# Patient Record
Sex: FEMALE | Race: WHITE | NOT HISPANIC OR LATINO | Employment: FULL TIME | URBAN - METROPOLITAN AREA
[De-identification: names, ages, dates, MRNs, and addresses within clinical notes are randomized per-mention and may not be internally consistent; named-entity substitution may affect disease eponyms.]

---

## 2017-01-04 ENCOUNTER — APPOINTMENT (OUTPATIENT)
Dept: PHYSICAL THERAPY | Facility: CLINIC | Age: 62
End: 2017-01-04
Payer: COMMERCIAL

## 2017-01-04 PROCEDURE — 97140 MANUAL THERAPY 1/> REGIONS: CPT

## 2017-01-04 PROCEDURE — 97110 THERAPEUTIC EXERCISES: CPT

## 2017-01-11 ENCOUNTER — APPOINTMENT (OUTPATIENT)
Dept: PHYSICAL THERAPY | Facility: CLINIC | Age: 62
End: 2017-01-11
Payer: COMMERCIAL

## 2017-01-13 ENCOUNTER — APPOINTMENT (OUTPATIENT)
Dept: PHYSICAL THERAPY | Facility: CLINIC | Age: 62
End: 2017-01-13
Payer: COMMERCIAL

## 2020-06-23 ENCOUNTER — EVALUATION (OUTPATIENT)
Dept: PHYSICAL THERAPY | Facility: CLINIC | Age: 65
End: 2020-06-23
Payer: COMMERCIAL

## 2020-06-23 DIAGNOSIS — M25.512 ACUTE PAIN OF LEFT SHOULDER: Primary | ICD-10-CM

## 2020-06-23 PROCEDURE — 97161 PT EVAL LOW COMPLEX 20 MIN: CPT | Performed by: PHYSICAL THERAPIST

## 2020-06-24 ENCOUNTER — TRANSCRIBE ORDERS (OUTPATIENT)
Dept: PHYSICAL THERAPY | Facility: CLINIC | Age: 65
End: 2020-06-24

## 2020-06-24 DIAGNOSIS — M25.512 ACUTE PAIN OF LEFT SHOULDER: Primary | ICD-10-CM

## 2020-06-29 ENCOUNTER — OFFICE VISIT (OUTPATIENT)
Dept: PHYSICAL THERAPY | Facility: CLINIC | Age: 65
End: 2020-06-29
Payer: COMMERCIAL

## 2020-06-29 DIAGNOSIS — M25.512 ACUTE PAIN OF LEFT SHOULDER: Primary | ICD-10-CM

## 2020-06-29 PROCEDURE — 97110 THERAPEUTIC EXERCISES: CPT

## 2020-06-29 PROCEDURE — 97112 NEUROMUSCULAR REEDUCATION: CPT

## 2020-07-06 ENCOUNTER — OFFICE VISIT (OUTPATIENT)
Dept: PHYSICAL THERAPY | Facility: CLINIC | Age: 65
End: 2020-07-06
Payer: COMMERCIAL

## 2020-07-06 DIAGNOSIS — M25.512 ACUTE PAIN OF LEFT SHOULDER: Primary | ICD-10-CM

## 2020-07-06 PROCEDURE — 97112 NEUROMUSCULAR REEDUCATION: CPT | Performed by: PHYSICAL THERAPIST

## 2020-07-06 PROCEDURE — 97140 MANUAL THERAPY 1/> REGIONS: CPT | Performed by: PHYSICAL THERAPIST

## 2020-07-06 PROCEDURE — 97110 THERAPEUTIC EXERCISES: CPT | Performed by: PHYSICAL THERAPIST

## 2020-07-06 NOTE — PROGRESS NOTES
Daily Note     Today's date: 2020  Patient name: Cary Rojo  : 1955  MRN: [id number]  Referring provider: John Carlos MD  Dx:   Encounter Diagnosis     ICD-10-CM    1  Acute pain of left shoulder M25 512                   Subjective: Pt reports feeling some tightness tonight      Objective: See treatment flow sheet  Assessment: Tolerated treatment well  She has restricted ST mobs left shoulder in all directions especially upward rotation  Plan: Continue per plan of care        Precautions:  None      Manuals 20     Visit # 1 2 3     PROM   5 min Left shld       MWM for scaption x 10 total      STM   5 min  UT, levator     Warm-up        NuStep                Neuro Re-Ed        Scap squeeze  GTB horizontal abd w/ scap sq x20 20       Wall posture assessment x 4-5 mins  4 min                     Ther Ex        Pulleys  x4 min scap and flex w/u 4 min     TB row  GTB 3x10 30   GTB     TB ext  GTB 3x10 30   GTB     TB IR/ER  Manual resistance x 20 each 20     IR stretch w/ strap  Attempted x 2-3 mins (pain) 3 min     Wand flex 10x Review  10     Wand ER   10       Wall slides x20 total 20     Extension stretch   10x             Ther Activity                                Modalities        CP/MH - post  home 10 min  MH

## 2020-07-09 ENCOUNTER — OFFICE VISIT (OUTPATIENT)
Dept: PHYSICAL THERAPY | Facility: CLINIC | Age: 65
End: 2020-07-09
Payer: COMMERCIAL

## 2020-07-09 DIAGNOSIS — M25.512 ACUTE PAIN OF LEFT SHOULDER: Primary | ICD-10-CM

## 2020-07-09 PROCEDURE — 97110 THERAPEUTIC EXERCISES: CPT | Performed by: PHYSICAL THERAPIST

## 2020-07-09 PROCEDURE — 97140 MANUAL THERAPY 1/> REGIONS: CPT | Performed by: PHYSICAL THERAPIST

## 2020-07-09 PROCEDURE — 97112 NEUROMUSCULAR REEDUCATION: CPT | Performed by: PHYSICAL THERAPIST

## 2020-07-09 NOTE — PROGRESS NOTES
Daily Note     Today's date: 2020  Patient name: Silvia Samaniego  : 1955  MRN: [id number]  Referring provider: Gilmer Mckeon MD  Dx:   Encounter Diagnosis     ICD-10-CM    1  Acute pain of left shoulder M25 512                   Subjective: Pt reports feeling soreness 2/10 left shoulder      Objective: See treatment flow sheet  Assessment: Tolerated treatment well  She has restricted motion and soreness with IR stretch using the strap  She has agreed to add this to HEP  Plan: Continue per plan of care        Precautions:  None      Manuals 20    Visit # 1 2 3 4    PROM   5 min Left shld 5'      MWM for scaption x 10 total      STM   5 min  UT, levator 5'    Warm-up        NuStep    10 min            Neuro Re-Ed        Scap squeeze  GTB horizontal abd w/ scap sq x20 20 20      Wall posture assessment x 4-5 mins  4 min 4 '                    Ther Ex        Pulleys  x4 min scap and flex w/u 4 min 4'    TB row  GTB 3x10 30   GTB 30   GTB    TB ext  GTB 3x10 30   GTB 30   GTB    TB IR/ER  Manual resistance x 20 each 20 20   YTB    IR stretch w/ strap  Attempted x 2-3 mins (pain) 3 min 4 '    Wand flex 10x Review  10 10    Wand ER   10 10      Wall slides x20 total 20 20    Extension stretch   10x 10            Ther Activity                                Modalities        CP/MH - post  home 10 min  MH 5 min  MH

## 2020-07-13 ENCOUNTER — OFFICE VISIT (OUTPATIENT)
Dept: PHYSICAL THERAPY | Facility: CLINIC | Age: 65
End: 2020-07-13
Payer: COMMERCIAL

## 2020-07-13 DIAGNOSIS — M25.512 ACUTE PAIN OF LEFT SHOULDER: Primary | ICD-10-CM

## 2020-07-13 PROCEDURE — 97110 THERAPEUTIC EXERCISES: CPT | Performed by: PHYSICAL THERAPIST

## 2020-07-13 PROCEDURE — 97140 MANUAL THERAPY 1/> REGIONS: CPT | Performed by: PHYSICAL THERAPIST

## 2020-07-13 PROCEDURE — 97112 NEUROMUSCULAR REEDUCATION: CPT | Performed by: PHYSICAL THERAPIST

## 2020-07-13 NOTE — PROGRESS NOTES
Daily Note     Today's date: 2020  Patient name: Isabel Gonzalez  : 1955  MRN: [id number]  Referring provider: Prince Avila MD  Dx:   Encounter Diagnosis     ICD-10-CM    1  Acute pain of left shoulder M25 512                   Subjective: Pt reports 3/10 pain left shoulder today  Objective: See treatment flow sheet  Assessment: Tolerated treatment well  She has end range restrictions to left shoulder ER  Plan: Continue per plan of care        Precautions:  None      Manuals 20   Visit # 1 2 3 4 5   PROM   5 min Left shld 5' 5'     MWM for scaption x 10 total      STM   5 min  UT, levator 5' 5'   Warm-up        NuStep    10 min 10 min           Neuro Re-Ed        Scap squeeze  GTB horizontal abd w/ scap sq x20 20 20 20     Wall posture assessment x 4-5 mins  4 min 4 ' 4'                   Ther Ex        Pulleys  x4 min scap and flex w/u 4 min 4' 5'   TB row  GTB 3x10 30   GTB 30   GTB 30    GTB   TB ext  GTB 3x10 30   GTB 30   GTB 30    GTB   TB IR/ER  Manual resistance x 20 each 20 20   YTB 20    YTB   IR stretch w/ strap  Attempted x 2-3 mins (pain) 3 min 4 ' 4'   Wand flex 10x Review  10 10 10   Wand ER   10 10 10     Wall slides x20 total 20 20 20   Extension stretch   10x 10 10           Ther Activity                                Modalities        CP/MH - post  home 10 min  MH 5 min  MH deferred

## 2020-07-16 ENCOUNTER — OFFICE VISIT (OUTPATIENT)
Dept: PHYSICAL THERAPY | Facility: CLINIC | Age: 65
End: 2020-07-16
Payer: COMMERCIAL

## 2020-07-16 DIAGNOSIS — M25.512 ACUTE PAIN OF LEFT SHOULDER: Primary | ICD-10-CM

## 2020-07-16 PROCEDURE — 97140 MANUAL THERAPY 1/> REGIONS: CPT | Performed by: PHYSICAL THERAPIST

## 2020-07-16 PROCEDURE — 97110 THERAPEUTIC EXERCISES: CPT | Performed by: PHYSICAL THERAPIST

## 2020-07-16 PROCEDURE — 97112 NEUROMUSCULAR REEDUCATION: CPT | Performed by: PHYSICAL THERAPIST

## 2020-07-16 NOTE — PROGRESS NOTES
Daily Note     Today's date: 2020  Patient name: Moris Nina  : 1955  MRN: [id number]  Referring provider: Chandrakant Pacheco MD  Dx:   Encounter Diagnosis     ICD-10-CM    1  Acute pain of left shoulder M25 512                   Subjective: Pt reports some improvement in her ability to reach her back  Objective: See treatment flow sheet  Assessment: Tolerated treatment well  ER and IR remain limited at end range  Plan: Continue per plan of care        Precautions:  None      Manuals 20       Visit # 6       PROM 5'               STM 5'       Warm-up        NuStep 10'               Neuro Re-Ed        Scap squeeze 20                               Ther Ex        Pulleys 5'       TB row 30    GTB       TB ext 30    GTB       TB IR/ER 20   RTB       IR stretch w/ strap 4'       Wand flex 20x       Wand ER 20x       Wall slide w/ liftoff 20       Extension stretch 10               Ther Activity                                Modalities        CP/MH - post ---

## 2020-07-20 ENCOUNTER — OFFICE VISIT (OUTPATIENT)
Dept: PHYSICAL THERAPY | Facility: CLINIC | Age: 65
End: 2020-07-20
Payer: COMMERCIAL

## 2020-07-20 DIAGNOSIS — M25.512 ACUTE PAIN OF LEFT SHOULDER: Primary | ICD-10-CM

## 2020-07-20 PROCEDURE — 97110 THERAPEUTIC EXERCISES: CPT

## 2020-07-20 NOTE — PROGRESS NOTES
Daily Note     Today's date: 2020  Patient name: Deshaun Norman  : 1955  MRN: [id number]  Referring provider: Governor Mj MD  Dx:   Encounter Diagnosis     ICD-10-CM    1  Acute pain of left shoulder M25 512                   Subjective: Patient states she is having difficulties putting on her bra, so she has to wear a forward clipped bra at this time  Reaching behind her back is difficult for her  Objective: See treatment diary below      Assessment: Tolerated treatment well  Patient would benefit from continued PT to improve L shoulder ROM and strength, and overall functional mobility  Patient tolerated progressions well today  Patient demonstrates forward trunk lean with internal rotation strap stretch  Verbal cueing required to prevent lean  Continue to progress as able  Plan: Continue per plan of care        Precautions:  None      Manuals 20      Visit # 6 7      PROM 5' 5'              STM 5' 5'      Warm-up        NuStep 10' 10'              Neuro Re-Ed        Scap squeeze 20 20                              Ther Ex        Pulleys 5' 5'      TB row 27    GTB 30    GTB      TB ext 30    GTB 30 GTB      TB IR/ER 20   RTB 20 GTB      IR stretch w/ strap 4' 4'      Wand flex 20x 20      Wand ER 20x 20      Wall slide w/ liftoff 20 20      Extension stretch 10 10              Ther Activity                                Modalities        CP/MH - post ---

## 2020-07-22 ENCOUNTER — APPOINTMENT (OUTPATIENT)
Dept: PHYSICAL THERAPY | Facility: CLINIC | Age: 65
End: 2020-07-22
Payer: COMMERCIAL

## 2020-07-27 ENCOUNTER — OFFICE VISIT (OUTPATIENT)
Dept: PHYSICAL THERAPY | Facility: CLINIC | Age: 65
End: 2020-07-27
Payer: COMMERCIAL

## 2020-07-27 DIAGNOSIS — M25.512 ACUTE PAIN OF LEFT SHOULDER: Primary | ICD-10-CM

## 2020-07-27 PROCEDURE — 97140 MANUAL THERAPY 1/> REGIONS: CPT | Performed by: PHYSICAL THERAPIST

## 2020-07-27 PROCEDURE — 97110 THERAPEUTIC EXERCISES: CPT | Performed by: PHYSICAL THERAPIST

## 2020-07-27 NOTE — PROGRESS NOTES
Daily Note     Today's date: 2020  Patient name: Maine Fink  : 1955  MRN: [id number]  Referring provider: Janette Arreola MD  Dx:   Encounter Diagnosis     ICD-10-CM    1  Acute pain of left shoulder M25 512                   Subjective:  She is compliant with IR stretching and it feels like it is improving  Objective: See treatment diary below      Assessment: Tolerated treatment well  She completed exercises with only end range discomfort  She would benefit from continued PT to resolve ROM restrictions left shoulder  Plan: Continue per plan of care        Precautions:  None      Manuals 20     Visit # 6 7 8     PROM 5' 5' 5'             STM 5' 5' 5'     Warm-up        NuStep 10' 10' 10'             Neuro Re-Ed        Scap squeeze 20 20 20                             Ther Ex        Pulleys 5' 5' 5'     TB row 30    GTB 30    GTB 30   GTB     TB ext 30    GTB 30 GTB 30   GTB     TB IR/ER 20   RTB 20 GTB 20   GTB     IR stretch w/ strap 4' 4' 4'     Wand flex 20x 20 20     Wand ER 20x 20 20     Wall slide w/ liftoff 20 20 20     Extension stretch 10 10 20             Ther Activity                                Modalities        CP/MH - post ---

## 2020-08-05 ENCOUNTER — OFFICE VISIT (OUTPATIENT)
Dept: PHYSICAL THERAPY | Facility: CLINIC | Age: 65
End: 2020-08-05
Payer: COMMERCIAL

## 2020-08-05 DIAGNOSIS — M25.512 ACUTE PAIN OF LEFT SHOULDER: Primary | ICD-10-CM

## 2020-08-05 PROCEDURE — 97140 MANUAL THERAPY 1/> REGIONS: CPT | Performed by: PHYSICAL THERAPIST

## 2020-08-05 PROCEDURE — 97110 THERAPEUTIC EXERCISES: CPT | Performed by: PHYSICAL THERAPIST

## 2020-08-05 NOTE — PROGRESS NOTES
Daily Note     Today's date: 2020  Patient name: Zhen Ramírez  : 1955  MRN: [id number]  Referring provider: Tika Justice MD  Dx:   Encounter Diagnosis     ICD-10-CM    1  Acute pain of left shoulder  M25 512                   Subjective:  She notes some improvement donning her bra but still has stiffness with initial attempts  Objective: See treatment diary below      Assessment: Tolerated treatment well  She agreed to add extension stretch and pes stretch to HEP daily  Plan: Continue per plan of care        Precautions:  None      Manuals 20    Visit # 6 7 8 9    PROM 5' 5' 5' 5'            STM 5' 5' 5' 5'    Warm-up        NuStep 10' 10' 10' 10'            Neuro Re-Ed        Scap squeeze 20 20 20 20                            Ther Ex        Pulleys 5' 5' 5' 5'    TB row 30    GTB 30    GTB 30   GTB 30   GTB    TB ext 30    GTB 30 GTB 30   GTB 30   GTB    TB IR/ER 20   RTB 20 GTB 20   GTB 30   GTB    IR stretch w/ strap 4' 4' 4' 4'    Wand flex 20x 20 20 20    Wand ER 20x 20 20 20    Wall slide w/ liftoff 20 20 20 20    Extension stretch 10 10 20 20    Pec stretch    10    Ther Activity                                Modalities        CP/MH - post ---   --

## 2020-08-12 ENCOUNTER — APPOINTMENT (OUTPATIENT)
Dept: PHYSICAL THERAPY | Facility: CLINIC | Age: 65
End: 2020-08-12
Payer: COMMERCIAL

## 2020-08-19 ENCOUNTER — OFFICE VISIT (OUTPATIENT)
Dept: PHYSICAL THERAPY | Facility: CLINIC | Age: 65
End: 2020-08-19
Payer: COMMERCIAL

## 2020-08-19 DIAGNOSIS — M25.512 ACUTE PAIN OF LEFT SHOULDER: Primary | ICD-10-CM

## 2020-08-19 PROCEDURE — 97140 MANUAL THERAPY 1/> REGIONS: CPT | Performed by: PHYSICAL THERAPIST

## 2020-08-19 PROCEDURE — 97110 THERAPEUTIC EXERCISES: CPT | Performed by: PHYSICAL THERAPIST

## 2020-08-19 NOTE — PROGRESS NOTES
Daily Note     Today's date: 2020  Patient name: Polo Alvarenga  : 1955  MRN: [id number]  Referring provider: Lucy France MD  Dx:   Encounter Diagnosis     ICD-10-CM    1  Acute pain of left shoulder  M25 512                   Subjective:  No pain tonight just mild stiffness      Objective: See treatment diary below      Assessment: Tolerated treatment well  ER of left shoulder has end range ROM restrictions  Plan: Continue per plan of care        Precautions:  None      Manuals 20   Visit # 6 7 8 9 10   PROM 5' 5' 5' 5' 5'           STM 5' 5' 5' 5' 5'   Warm-up        NuStep 10' 10' 10' 10' 10'           Neuro Re-Ed        Scap squeeze 20 20 20 20 20                           Ther Ex        Pulleys 5' 5' 5' 5' 5'   TB row 30    GTB 30    GTB 30   GTB 30   GTB 30   BTB   TB ext 30    GTB 30 GTB 30   GTB 30   GTB 30   BTB   TB IR/ER 20   RTB 20 GTB 20   GTB 30   GTB 30   GTB   IR stretch w/ strap 4' 4' 4' 4' 4'   Wand flex 20x 20 20 20 20   Wand ER 20x 20 20 20 20   Wall slide w/ liftoff 20 20 20 20 20   Extension stretch 10 10 20 20 20   Pec stretch    10 15   Ther Activity                                Modalities        CP/MH - post ---   -- --

## 2020-08-27 ENCOUNTER — OFFICE VISIT (OUTPATIENT)
Dept: PHYSICAL THERAPY | Facility: CLINIC | Age: 65
End: 2020-08-27
Payer: COMMERCIAL

## 2020-08-27 DIAGNOSIS — M25.512 ACUTE PAIN OF LEFT SHOULDER: Primary | ICD-10-CM

## 2020-08-27 PROCEDURE — 97140 MANUAL THERAPY 1/> REGIONS: CPT | Performed by: PHYSICAL THERAPIST

## 2020-08-27 PROCEDURE — 97110 THERAPEUTIC EXERCISES: CPT | Performed by: PHYSICAL THERAPIST

## 2020-08-27 NOTE — PROGRESS NOTES
Daily Note     Today's date: 2020  Patient name: Rosina Macedo  : 1955  MRN: [id number]  Referring provider: Carlee Loera MD  Dx:   Encounter Diagnosis     ICD-10-CM    1  Acute pain of left shoulder  M25 512                   Subjective:  Brett Elder reports some stiffness left shoulder  Objective: See treatment diary below      Assessment: Tolerated treatment well  Only end range restrictions remain  Patient would benefit from addition f [place] stretch to improve end range moblity      Plan: Continue per plan of care        Precautions:  None      Manuals 20       Visit # 11       PROM 5'               STM 5'       Warm-up        NuStep 10'               Neuro Re-Ed                                Ther Ex        Pulleys 5'       TB row 30    GTB       TB ext 30    GTB       TB IR/ER 20   RTB       IR stretch w/ strap 4'       Wand flex 20x       Wand ER 20x       Wall slide w/ liftoff 20       Extension stretch 10       Pec stretch 15       [place] stretch                        Ther Activity                                Modalities        CP/MH - post ---

## 2020-09-03 ENCOUNTER — OFFICE VISIT (OUTPATIENT)
Dept: PHYSICAL THERAPY | Facility: CLINIC | Age: 65
End: 2020-09-03
Payer: COMMERCIAL

## 2020-09-03 DIAGNOSIS — M25.512 ACUTE PAIN OF LEFT SHOULDER: Primary | ICD-10-CM

## 2020-09-03 PROCEDURE — 97110 THERAPEUTIC EXERCISES: CPT | Performed by: PHYSICAL THERAPIST

## 2020-09-03 PROCEDURE — 97140 MANUAL THERAPY 1/> REGIONS: CPT | Performed by: PHYSICAL THERAPIST

## 2020-09-03 NOTE — PROGRESS NOTES
Daily Note     Today's date: 9/3/2020  Patient name: Zoë Robbins  : 1955  MRN: [id number]  Referring provider: Yolanda Woodward MD  Dx:   Encounter Diagnosis     ICD-10-CM    1  Acute pain of left shoulder  M25 512                   Subjective:  Dk Kim reports 1/10 soreness today just because it's the end of the day      Objective: See treatment diary below      Assessment: Tolerated treatment well  [place] stretch helped improve end range tightness for flexion and abduction  Plan: Continue per plan of care        Precautions:  None      Manuals 8/27/20 9/3/20      Visit # 11 12      PROM 5' 5'              STM 5' 5'      Warm-up        NuStep 10' 10'              Neuro Re-Ed                                Ther Ex        Pulleys 5' 5'      TB row 30    GTB 30   GTB      TB ext 30    GTB 30   GTB      TB IR/ER 20   RTB 20   RTB      IR stretch w/ strap 4' 4'      Wand flex 20x 20      Wand ER 20x 20      Wall slide w/ liftoff 20 20      Extension stretch 10 20      Pec stretch 15 15      [place] stretch  3x ea                      Ther Activity                                Modalities        CP/MH - post --- ---

## 2020-09-10 ENCOUNTER — APPOINTMENT (OUTPATIENT)
Dept: PHYSICAL THERAPY | Facility: CLINIC | Age: 65
End: 2020-09-10
Payer: COMMERCIAL

## 2020-09-14 ENCOUNTER — OFFICE VISIT (OUTPATIENT)
Dept: PHYSICAL THERAPY | Facility: CLINIC | Age: 65
End: 2020-09-14
Payer: COMMERCIAL

## 2020-09-14 DIAGNOSIS — M25.512 ACUTE PAIN OF LEFT SHOULDER: Primary | ICD-10-CM

## 2020-09-14 PROCEDURE — 97140 MANUAL THERAPY 1/> REGIONS: CPT | Performed by: PHYSICAL THERAPIST

## 2020-09-14 PROCEDURE — 97110 THERAPEUTIC EXERCISES: CPT | Performed by: PHYSICAL THERAPIST

## 2020-09-14 NOTE — PROGRESS NOTES
Daily Note     Today's date: 2020  Patient name: Zhen Ramírez  : 1955  MRN: [id number]  Referring provider: Tika Justice MD  Dx:   Encounter Diagnosis     ICD-10-CM    1  Acute pain of left shoulder  M25 512                   Subjective:  Radha Shaffer reports no pain, mild stiffness left shoulder      Objective: See treatment diary below      Assessment: Tolerated treatment well  Improved flex and abd ROM with addition of [place] stretch to HEP  Some restriction with IR behind the back  Plan: Continue per plan of care        Precautions:  None      Manuals 8/27/20 9/3/20 9/14/20     Visit # 11 12 13     PROM 5' 5' 5'     STM 5' 5' 5'     Warm-up        NuStep 10' 10' 10'             Neuro Re-Ed                                Ther Ex        Pulleys 5' 5' 5'     TB row 30    GTB 30   GTB 30   GTB     TB ext 30    GTB 30   GTB 30   GTB     TB IR/ER 20   RTB 20   RTB 30   GTB     IR stretch w/ strap 4' 4' 4'     Wand flex 20x 20 20     Wand ER 20x 20 20     Wall slide w/ liftoff 20 20 20     Extension stretch 10 20 20     Pec stretch 15 15 15     [place] stretch  3x ea 5x ea                     Ther Activity                                Modalities        CP/MH - post --- --- --

## 2020-09-24 ENCOUNTER — APPOINTMENT (OUTPATIENT)
Dept: PHYSICAL THERAPY | Facility: CLINIC | Age: 65
End: 2020-09-24
Payer: COMMERCIAL

## 2020-10-07 ENCOUNTER — OFFICE VISIT (OUTPATIENT)
Dept: PHYSICAL THERAPY | Facility: CLINIC | Age: 65
End: 2020-10-07
Payer: COMMERCIAL

## 2020-10-07 DIAGNOSIS — M25.512 ACUTE PAIN OF LEFT SHOULDER: Primary | ICD-10-CM

## 2020-10-07 PROCEDURE — 97140 MANUAL THERAPY 1/> REGIONS: CPT | Performed by: PHYSICAL THERAPIST

## 2020-10-07 PROCEDURE — 97110 THERAPEUTIC EXERCISES: CPT | Performed by: PHYSICAL THERAPIST
